# Patient Record
Sex: MALE | Race: BLACK OR AFRICAN AMERICAN | Employment: FULL TIME | URBAN - METROPOLITAN AREA
[De-identification: names, ages, dates, MRNs, and addresses within clinical notes are randomized per-mention and may not be internally consistent; named-entity substitution may affect disease eponyms.]

---

## 2018-07-19 ENCOUNTER — HOSPITAL ENCOUNTER (OUTPATIENT)
Dept: OTHER | Age: 38
Discharge: HOME | End: 2018-07-19
Attending: NEUROLOGICAL SURGERY | Admitting: NEUROLOGICAL SURGERY

## 2018-07-19 LAB — SEDIMENTATION RATE, ERYTHROCYTE: 8 MM/HR (ref 0–15)

## 2018-07-20 LAB
ANA INTERPRETATION: ABNORMAL
ANA PATTERN: ABNORMAL
ANA TITER: ABNORMAL
ANTI-NUCLEAR ANTIBODY (ANA): POSITIVE

## 2018-07-22 LAB
ANA IGG, ELISA: DETECTED
C3 COMPLEMENT: 122 MG/DL (ref 88–201)
C4 COMPLEMENT: 23 MG/DL (ref 10–40)
RHEUMATOID FACTOR: <10 IU/ML (ref 0–14)

## 2018-07-25 LAB — ANA BY IFA: NORMAL

## 2018-08-28 ENCOUNTER — HOSPITAL ENCOUNTER (OUTPATIENT)
Dept: GENERAL RADIOLOGY | Age: 38
Discharge: HOME | End: 2018-08-28
Payer: COMMERCIAL

## 2018-08-28 VITALS
OXYGEN SATURATION: 97 % | SYSTOLIC BLOOD PRESSURE: 145 MMHG | RESPIRATION RATE: 16 BRPM | HEART RATE: 77 BPM | DIASTOLIC BLOOD PRESSURE: 88 MMHG | TEMPERATURE: 98.1 F

## 2018-08-28 DIAGNOSIS — M21.372 ACQUIRED BILATERAL FOOT DROP: ICD-10-CM

## 2018-08-28 DIAGNOSIS — M21.371 ACQUIRED BILATERAL FOOT DROP: ICD-10-CM

## 2018-08-28 LAB
APPEARANCE CSF: CLEAR
CLOT EVALUATION CSF: ABNORMAL
COLOR CSF: COLORLESS
GLUCOSE, CSF: 65 MG/DL (ref 40–80)
NO DIFFERENTIAL CSF: ABNORMAL
PROTEIN CSF: 45 MG/DL (ref 15–45)
RBC CSF: 3 /CUMM
TUBE NUMBER CSF: ABNORMAL
WBC CSF: 1 /CUMM (ref 0–5)

## 2018-08-28 PROCEDURE — 36415 COLL VENOUS BLD VENIPUNCTURE: CPT

## 2018-08-28 PROCEDURE — 62270 DX LMBR SPI PNXR: CPT

## 2018-08-28 PROCEDURE — 82040 ASSAY OF SERUM ALBUMIN: CPT

## 2018-08-28 PROCEDURE — 82945 GLUCOSE OTHER FLUID: CPT

## 2018-08-28 PROCEDURE — 82042 OTHER SOURCE ALBUMIN QUAN EA: CPT

## 2018-08-28 PROCEDURE — 83916 OLIGOCLONAL BANDS: CPT

## 2018-08-28 PROCEDURE — 7100000010 HC PHASE II RECOVERY - FIRST 15 MIN

## 2018-08-28 PROCEDURE — 88112 CYTOPATH CELL ENHANCE TECH: CPT

## 2018-08-28 PROCEDURE — 2500000003 HC RX 250 WO HCPCS: Performed by: PSYCHIATRY & NEUROLOGY

## 2018-08-28 PROCEDURE — 84157 ASSAY OF PROTEIN OTHER: CPT

## 2018-08-28 PROCEDURE — 82784 ASSAY IGA/IGD/IGG/IGM EACH: CPT

## 2018-08-28 PROCEDURE — 89050 BODY FLUID CELL COUNT: CPT

## 2018-08-28 PROCEDURE — 7100000011 HC PHASE II RECOVERY - ADDTL 15 MIN

## 2018-08-28 RX ORDER — LIDOCAINE HYDROCHLORIDE 10 MG/ML
5 INJECTION, SOLUTION INFILTRATION; PERINEURAL ONCE
Status: COMPLETED | OUTPATIENT
Start: 2018-08-28 | End: 2018-08-28

## 2018-08-28 RX ADMIN — LIDOCAINE HYDROCHLORIDE 5 ML: 10 INJECTION, SOLUTION INFILTRATION; PERINEURAL at 13:04

## 2018-08-28 RX ADMIN — SODIUM BICARBONATE 1.44 MEQ: 0.2 INJECTION, SOLUTION INTRAVENOUS at 13:04

## 2018-08-28 ASSESSMENT — PAIN SCALES - GENERAL: PAINLEVEL_OUTOF10: 0

## 2018-08-30 LAB
ALBUMIN CSF: 31.4 MG/DL (ref 10–30)
ALBUMIN SERPL-MCNC: 4288 MG/DL (ref 3400–5000)
CSF INTERPRETATION: NORMAL
IGG CSF: 4.2 MG/DL (ref 0–6)
IGG INDEX: 0.56 (ref 0.2–0.7)
IGG SYNTHESIS RATE CSF: 0.7 MG/DAY (ref -9.9–3.3)
IGG: 1019 MG/DL (ref 700–1600)

## 2018-08-31 LAB
OLIGOCLONAL BANDS CSF: 0
OLIGOCLONAL BANDS: 0

## 2023-07-06 ENCOUNTER — HOSPITAL ENCOUNTER (OUTPATIENT)
Dept: OCCUPATIONAL THERAPY | Age: 43
Setting detail: THERAPIES SERIES
Discharge: HOME | End: 2023-07-06
Payer: COMMERCIAL

## 2023-07-06 PROCEDURE — 97537 COMMUNITY/WORK REINTEGRATION: CPT

## 2023-07-06 PROCEDURE — 97165 OT EVAL LOW COMPLEX 30 MIN: CPT

## 2023-07-11 ENCOUNTER — HOSPITAL ENCOUNTER (OUTPATIENT)
Dept: OCCUPATIONAL THERAPY | Age: 43
Setting detail: THERAPIES SERIES
Discharge: HOME | End: 2023-07-11
Payer: COMMERCIAL

## 2023-07-11 PROCEDURE — 97537 COMMUNITY/WORK REINTEGRATION: CPT
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
## 2023-07-11 NOTE — PROGRESS NOTES
VEHICLE MODIFICATION AND ADAPTIVE EQUIPMENT RECOMMENDATIONS                         FOR DRIVING AND/OR TRANSPORTATION        Date: 7/11/23    Name: Henna Khan    Date(s) seen:  7/6/23, 7/11/23    Vehicle To Be Modified: Berenice Bernstein    Mobility Aids: Stanton Paez    In order for client to operate the vehicle and be a safe , client requires:    [place] Push/rock hand control left side mounted        This prescription has been written after an objective clinical evaluation. Any changes or substitution made deviating from this prescription releases author from liability.       Pato Noel, CDRS, [address]   Certified  Rehabilitation Specialist

## 2023-07-20 ENCOUNTER — APPOINTMENT (OUTPATIENT)
Dept: OCCUPATIONAL THERAPY | Age: 43
End: 2023-07-20
Payer: COMMERCIAL